# Patient Record
Sex: FEMALE | NOT HISPANIC OR LATINO | Employment: OTHER | URBAN - METROPOLITAN AREA
[De-identification: names, ages, dates, MRNs, and addresses within clinical notes are randomized per-mention and may not be internally consistent; named-entity substitution may affect disease eponyms.]

---

## 2022-03-11 ENCOUNTER — TELEPHONE (OUTPATIENT)
Dept: UROLOGY | Facility: CLINIC | Age: 82
End: 2022-03-11
Payer: MEDICARE

## 2022-04-12 ENCOUNTER — OFFICE VISIT (OUTPATIENT)
Dept: UROLOGY | Facility: CLINIC | Age: 82
End: 2022-04-12
Payer: MEDICARE

## 2022-04-12 VITALS
WEIGHT: 134 LBS | SYSTOLIC BLOOD PRESSURE: 187 MMHG | HEART RATE: 91 BPM | DIASTOLIC BLOOD PRESSURE: 77 MMHG | BODY MASS INDEX: 22.88 KG/M2 | HEIGHT: 64 IN

## 2022-04-12 DIAGNOSIS — N28.89 RENAL MASS: Primary | ICD-10-CM

## 2022-04-12 PROCEDURE — 99204 PR OFFICE/OUTPT VISIT, NEW, LEVL IV, 45-59 MIN: ICD-10-PCS | Mod: S$GLB,,, | Performed by: UROLOGY

## 2022-04-12 PROCEDURE — 99204 OFFICE O/P NEW MOD 45 MIN: CPT | Mod: S$GLB,,, | Performed by: UROLOGY

## 2022-04-12 RX ORDER — HYDROCODONE BITARTRATE AND ACETAMINOPHEN 5; 325 MG/1; MG/1
TABLET ORAL
COMMUNITY
Start: 2022-04-11

## 2022-04-12 RX ORDER — PROMETHAZINE HYDROCHLORIDE 12.5 MG/1
TABLET ORAL
COMMUNITY
Start: 2022-04-06

## 2022-04-12 RX ORDER — ROSUVASTATIN CALCIUM 10 MG/1
TABLET, COATED ORAL
COMMUNITY
Start: 2022-03-10

## 2022-04-12 RX ORDER — ZOLPIDEM TARTRATE 10 MG/1
TABLET ORAL
COMMUNITY
Start: 2022-04-06

## 2022-04-12 RX ORDER — ALENDRONATE SODIUM 70 MG/1
70 TABLET ORAL
COMMUNITY
Start: 2022-01-10

## 2022-04-12 RX ORDER — AMLODIPINE BESYLATE 10 MG/1
5 TABLET ORAL DAILY
COMMUNITY
Start: 2022-03-10

## 2022-04-12 RX ORDER — SERTRALINE HYDROCHLORIDE 100 MG/1
50 TABLET, FILM COATED ORAL DAILY
COMMUNITY
Start: 2021-12-29

## 2022-04-12 RX ORDER — AMOXICILLIN 500 MG/1
CAPSULE ORAL
COMMUNITY
Start: 2022-04-11

## 2022-04-12 RX ORDER — DEXAMETHASONE 4 MG/1
TABLET ORAL
COMMUNITY
Start: 2022-04-11

## 2022-04-12 NOTE — PROGRESS NOTES
Subjective:       Patient ID: Genevieve Dickens is a 81 y.o. female.    Chief Complaint: Renal Lesion      HPI:  81-year-old female who recently had imaging for a questionable lung lesion cyst found to have a 7 mm lesion on her kidney unfortunately we do not have those images available nor do we have the report.    Past Medical History: History reviewed. No pertinent past medical history.    Past Surgical Historical: History reviewed. No pertinent surgical history.     Medications:   Medication List with Changes/Refills   Current Medications    ALENDRONATE (FOSAMAX) 70 MG TABLET    Take 70 mg by mouth every 7 days.    AMLODIPINE (NORVASC) 10 MG TABLET    Take 5 mg by mouth once daily.    AMOXICILLIN (AMOXIL) 500 MG CAPSULE        DEXAMETHASONE (DECADRON) 4 MG TAB        HYDROCODONE-ACETAMINOPHEN (NORCO) 5-325 MG PER TABLET        PROMETHAZINE (PHENERGAN) 12.5 MG TAB    TAKE 1 TABLET BY MOUTH EVERY EIGHT HOURS AS NEEDED FOR NAUSEA    ROSUVASTATIN (CRESTOR) 10 MG TABLET        SERTRALINE (ZOLOFT) 100 MG TABLET    Take 50 mg by mouth once daily.    ZOLPIDEM (AMBIEN) 10 MG TAB    TAKE 1 TABLET BY MOUTH EVERY NIGHT AS NEEDED FOR INSOMNIA        Past Social History:   Social History     Socioeconomic History    Marital status: Unknown   Tobacco Use    Smoking status: Never Smoker    Smokeless tobacco: Never Used   Substance and Sexual Activity    Alcohol use: Yes    Drug use: Never       Allergies: Review of patient's allergies indicates:  No Known Allergies     Family History: History reviewed. No pertinent family history.     Review of Systems:  Review of Systems   Constitutional: Negative for activity change and appetite change.   HENT: Negative for congestion and dental problem.    Respiratory: Negative for chest tightness and shortness of breath.    Cardiovascular: Negative for chest pain.   Gastrointestinal: Negative for abdominal distention and abdominal pain.   Genitourinary: Negative for decreased urine  volume, difficulty urinating, dyspareunia, dysuria, enuresis, flank pain, frequency, genital sores, hematuria, pelvic pain and urgency.   Musculoskeletal: Negative for back pain and neck pain.   Allergic/Immunologic: Negative for immunocompromised state.   Neurological: Negative for dizziness.   Hematological: Negative for adenopathy.   Psychiatric/Behavioral: Negative for agitation, behavioral problems and confusion.       Physical Exam:  Physical Exam  Constitutional:       Appearance: She is well-developed.   HENT:      Head: Normocephalic and atraumatic.      Right Ear: External ear normal.      Left Ear: External ear normal.   Eyes:      Conjunctiva/sclera: Conjunctivae normal.   Neck:      Vascular: No JVD.   Cardiovascular:      Rate and Rhythm: Normal rate and regular rhythm.   Pulmonary:      Effort: Pulmonary effort is normal. No respiratory distress.      Breath sounds: Normal breath sounds. No wheezing.   Abdominal:      General: There is no distension.      Palpations: Abdomen is soft.      Tenderness: There is no abdominal tenderness. There is no rebound.   Musculoskeletal:         General: Normal range of motion.      Cervical back: Normal range of motion.   Skin:     General: Skin is warm and dry.      Findings: No erythema or rash.   Neurological:      Mental Status: She is alert and oriented to person, place, and time.   Psychiatric:         Behavior: Behavior normal.         Assessment/Plan:       Problem List Items Addressed This Visit    None     Visit Diagnoses     Renal mass    -  Primary             Renal mass:  We will need to order a CT renal mass protocol to evaluate her renal lesion to determine the best way to proceed

## 2022-04-14 DIAGNOSIS — N28.89 RENAL MASS: Primary | ICD-10-CM

## 2022-04-18 LAB
CREAT SERPL-MCNC: 0.79 MG/DL (ref 0.55–1.02)
GFR ESTIMATION: > 60

## 2022-04-21 ENCOUNTER — TELEPHONE (OUTPATIENT)
Dept: UROLOGY | Facility: CLINIC | Age: 82
End: 2022-04-21
Payer: MEDICARE

## 2022-04-21 DIAGNOSIS — N28.89 RENAL MASS: Primary | ICD-10-CM

## 2022-04-21 NOTE — TELEPHONE ENCOUNTER
Informed pt of results and that we will send a referral to IR. She verbalized understanding and will be waiting on a call. She is going out of town for a week next week.

## 2022-04-21 NOTE — TELEPHONE ENCOUNTER
----- Message from Mirella Collins sent at 4/21/2022 11:47 AM CDT -----   Patient need to speak to nurse regarding her test results. Call back number [phone] (home)

## 2022-04-22 ENCOUNTER — TELEPHONE (OUTPATIENT)
Dept: UROLOGY | Facility: CLINIC | Age: 82
End: 2022-04-22
Payer: MEDICARE

## 2022-04-22 NOTE — TELEPHONE ENCOUNTER
----- Message from Lucero Hussein sent at 4/22/2022 12:20 PM CDT -----  Type:  Test Results    Who Called: Genevieve Dickens ( pt's dtr Pamela)    Name of Test (Lab/Mammo/Etc):  CT   Date of Test: 04/18/22  Ordering Provider:  Jewel   Where the test was performed: -  Would the patient rather a call back or a response via [facility]?    Best Call Back Number: [phone]  Additional Information:   wants to know if the results have come back , please call

## 2022-04-22 NOTE — TELEPHONE ENCOUNTER
Verified rashid in chart under media... informed her of plan with biopsy at IR. She understood and wants them to call her to schedule. She stated she is POA. Will add onto referral. Lpn

## 2022-04-22 NOTE — TELEPHONE ENCOUNTER
----- Message from Sandra Paz sent at 4/22/2022  1:40 PM CDT -----  Contact: pt dgtr  Pt dgtr Pamela calling for more info of test pt needs to have and she can be reached at [phone]    Thanks,

## 2022-05-10 ENCOUNTER — TELEPHONE (OUTPATIENT)
Dept: UROLOGY | Facility: CLINIC | Age: 82
End: 2022-05-10
Payer: MEDICARE

## 2022-05-10 NOTE — TELEPHONE ENCOUNTER
We have received a message from Costa Alejandro([email or web address]) to hold off on procedure. She will call IR when she is ready to proceed.